# Patient Record
Sex: MALE | Race: OTHER | HISPANIC OR LATINO | URBAN - METROPOLITAN AREA
[De-identification: names, ages, dates, MRNs, and addresses within clinical notes are randomized per-mention and may not be internally consistent; named-entity substitution may affect disease eponyms.]

---

## 2019-08-25 ENCOUNTER — INPATIENT (INPATIENT)
Facility: HOSPITAL | Age: 39
LOS: 0 days | Discharge: HOME | DRG: 918 | End: 2019-08-26
Attending: INTERNAL MEDICINE | Admitting: INTERNAL MEDICINE
Payer: SELF-PAY

## 2019-08-25 VITALS
HEART RATE: 69 BPM | TEMPERATURE: 98 F | SYSTOLIC BLOOD PRESSURE: 124 MMHG | OXYGEN SATURATION: 98 % | RESPIRATION RATE: 15 BRPM | DIASTOLIC BLOOD PRESSURE: 83 MMHG

## 2019-08-25 DIAGNOSIS — R63.8 OTHER SYMPTOMS AND SIGNS CONCERNING FOOD AND FLUID INTAKE: ICD-10-CM

## 2019-08-25 DIAGNOSIS — Z91.89 OTHER SPECIFIED PERSONAL RISK FACTORS, NOT ELSEWHERE CLASSIFIED: ICD-10-CM

## 2019-08-25 DIAGNOSIS — I21.4 NON-ST ELEVATION (NSTEMI) MYOCARDIAL INFARCTION: ICD-10-CM

## 2019-08-25 DIAGNOSIS — F14.10 COCAINE ABUSE, UNCOMPLICATED: ICD-10-CM

## 2019-08-25 LAB
ALBUMIN SERPL ELPH-MCNC: 4.1 G/DL — SIGNIFICANT CHANGE UP (ref 3.4–5)
ALP SERPL-CCNC: 91 U/L — SIGNIFICANT CHANGE UP (ref 40–120)
ALT FLD-CCNC: 22 U/L — SIGNIFICANT CHANGE UP (ref 12–42)
ANION GAP SERPL CALC-SCNC: 8 MMOL/L — LOW (ref 9–16)
APTT BLD: 32 SEC — SIGNIFICANT CHANGE UP (ref 27.5–36.3)
APTT BLD: 55.6 SEC — HIGH (ref 27.5–36.3)
AST SERPL-CCNC: 21 U/L — SIGNIFICANT CHANGE UP (ref 15–37)
BASOPHILS NFR BLD AUTO: 0.4 % — SIGNIFICANT CHANGE UP (ref 0–2)
BILIRUB SERPL-MCNC: 0.8 MG/DL — SIGNIFICANT CHANGE UP (ref 0.2–1.2)
BUN SERPL-MCNC: 16 MG/DL — SIGNIFICANT CHANGE UP (ref 7–23)
CALCIUM SERPL-MCNC: 9.1 MG/DL — SIGNIFICANT CHANGE UP (ref 8.5–10.5)
CHLORIDE SERPL-SCNC: 107 MMOL/L — SIGNIFICANT CHANGE UP (ref 96–108)
CO2 SERPL-SCNC: 29 MMOL/L — SIGNIFICANT CHANGE UP (ref 22–31)
CREAT SERPL-MCNC: 0.88 MG/DL — SIGNIFICANT CHANGE UP (ref 0.5–1.3)
EOSINOPHIL NFR BLD AUTO: 2.8 % — SIGNIFICANT CHANGE UP (ref 0–6)
ETHANOL SERPL-MCNC: <3 MG/DL — SIGNIFICANT CHANGE UP
GLUCOSE SERPL-MCNC: 96 MG/DL — SIGNIFICANT CHANGE UP (ref 70–99)
HCT VFR BLD CALC: 48 % — SIGNIFICANT CHANGE UP (ref 39–50)
HGB BLD-MCNC: 16.1 G/DL — SIGNIFICANT CHANGE UP (ref 13–17)
IMM GRANULOCYTES NFR BLD AUTO: 0.4 % — SIGNIFICANT CHANGE UP (ref 0–1.5)
INR BLD: 1.16 — SIGNIFICANT CHANGE UP (ref 0.88–1.16)
LYMPHOCYTES # BLD AUTO: 25.1 % — SIGNIFICANT CHANGE UP (ref 13–44)
MAGNESIUM SERPL-MCNC: 1.9 MG/DL — SIGNIFICANT CHANGE UP (ref 1.6–2.6)
MCHC RBC-ENTMCNC: 28.3 PG — SIGNIFICANT CHANGE UP (ref 27–34)
MCHC RBC-ENTMCNC: 33.5 G/DL — SIGNIFICANT CHANGE UP (ref 32–36)
MCV RBC AUTO: 84.5 FL — SIGNIFICANT CHANGE UP (ref 80–100)
MONOCYTES NFR BLD AUTO: 9.8 % — SIGNIFICANT CHANGE UP (ref 2–14)
NEUTROPHILS NFR BLD AUTO: 61.5 % — SIGNIFICANT CHANGE UP (ref 43–77)
NT-PROBNP SERPL-SCNC: 465 PG/ML — HIGH
PCP SPEC-MCNC: SIGNIFICANT CHANGE UP
PLATELET # BLD AUTO: 164 K/UL — SIGNIFICANT CHANGE UP (ref 150–400)
POTASSIUM SERPL-MCNC: 4.1 MMOL/L — SIGNIFICANT CHANGE UP (ref 3.5–5.3)
POTASSIUM SERPL-SCNC: 4.1 MMOL/L — SIGNIFICANT CHANGE UP (ref 3.5–5.3)
PROT SERPL-MCNC: 7.7 G/DL — SIGNIFICANT CHANGE UP (ref 6.4–8.2)
PROTHROM AB SERPL-ACNC: 12.8 SEC — SIGNIFICANT CHANGE UP (ref 10–12.9)
RBC # BLD: 5.68 M/UL — SIGNIFICANT CHANGE UP (ref 4.2–5.8)
RBC # FLD: 13.5 % — SIGNIFICANT CHANGE UP (ref 10.3–14.5)
SODIUM SERPL-SCNC: 144 MMOL/L — SIGNIFICANT CHANGE UP (ref 132–145)
TROPONIN I SERPL-MCNC: 0.7 NG/ML — CRITICAL HIGH (ref 0.02–0.06)
TROPONIN T SERPL-MCNC: <0.01 NG/ML — SIGNIFICANT CHANGE UP (ref 0–0.01)
TROPONIN T SERPL-MCNC: <0.01 NG/ML — SIGNIFICANT CHANGE UP (ref 0–0.01)
TSH SERPL-MCNC: 0.24 UIU/ML — LOW (ref 0.36–3.74)
WBC # BLD: 6.7 K/UL — SIGNIFICANT CHANGE UP (ref 3.8–10.5)
WBC # FLD AUTO: 6.7 K/UL — SIGNIFICANT CHANGE UP (ref 3.8–10.5)

## 2019-08-25 PROCEDURE — 71046 X-RAY EXAM CHEST 2 VIEWS: CPT | Mod: 26

## 2019-08-25 PROCEDURE — 99285 EMERGENCY DEPT VISIT HI MDM: CPT | Mod: 25

## 2019-08-25 PROCEDURE — 99222 1ST HOSP IP/OBS MODERATE 55: CPT

## 2019-08-25 PROCEDURE — 93010 ELECTROCARDIOGRAM REPORT: CPT

## 2019-08-25 RX ORDER — NIFEDIPINE 30 MG
30 TABLET, EXTENDED RELEASE 24 HR ORAL DAILY
Refills: 0 | Status: DISCONTINUED | OUTPATIENT
Start: 2019-08-26 | End: 2019-08-26

## 2019-08-25 RX ORDER — ATORVASTATIN CALCIUM 80 MG/1
80 TABLET, FILM COATED ORAL AT BEDTIME
Refills: 0 | Status: DISCONTINUED | OUTPATIENT
Start: 2019-08-25 | End: 2019-08-26

## 2019-08-25 RX ORDER — HEPARIN SODIUM 5000 [USP'U]/ML
INJECTION INTRAVENOUS; SUBCUTANEOUS
Qty: 25000 | Refills: 0 | Status: DISCONTINUED | OUTPATIENT
Start: 2019-08-25 | End: 2019-08-26

## 2019-08-25 RX ORDER — NIFEDIPINE 30 MG
30 TABLET, EXTENDED RELEASE 24 HR ORAL ONCE
Refills: 0 | Status: COMPLETED | OUTPATIENT
Start: 2019-08-25 | End: 2019-08-25

## 2019-08-25 RX ORDER — ASPIRIN/CALCIUM CARB/MAGNESIUM 324 MG
81 TABLET ORAL DAILY
Refills: 0 | Status: DISCONTINUED | OUTPATIENT
Start: 2019-08-25 | End: 2019-08-26

## 2019-08-25 RX ORDER — DIAZEPAM 5 MG
5 TABLET ORAL ONCE
Refills: 0 | Status: DISCONTINUED | OUTPATIENT
Start: 2019-08-25 | End: 2019-08-25

## 2019-08-25 RX ORDER — ASPIRIN/CALCIUM CARB/MAGNESIUM 324 MG
325 TABLET ORAL ONCE
Refills: 0 | Status: COMPLETED | OUTPATIENT
Start: 2019-08-25 | End: 2019-08-25

## 2019-08-25 RX ORDER — NITROGLYCERIN 6.5 MG
0.4 CAPSULE, EXTENDED RELEASE ORAL ONCE
Refills: 0 | Status: COMPLETED | OUTPATIENT
Start: 2019-08-25 | End: 2019-08-25

## 2019-08-25 RX ORDER — HEPARIN SODIUM 5000 [USP'U]/ML
4100 INJECTION INTRAVENOUS; SUBCUTANEOUS ONCE
Refills: 0 | Status: COMPLETED | OUTPATIENT
Start: 2019-08-25 | End: 2019-08-25

## 2019-08-25 RX ORDER — SODIUM CHLORIDE 9 MG/ML
1000 INJECTION INTRAMUSCULAR; INTRAVENOUS; SUBCUTANEOUS ONCE
Refills: 0 | Status: COMPLETED | OUTPATIENT
Start: 2019-08-25 | End: 2019-08-25

## 2019-08-25 RX ADMIN — Medication 30 MILLIGRAM(S): at 15:20

## 2019-08-25 RX ADMIN — SODIUM CHLORIDE 1000 MILLILITER(S): 9 INJECTION INTRAMUSCULAR; INTRAVENOUS; SUBCUTANEOUS at 14:16

## 2019-08-25 RX ADMIN — Medication 5 MILLIGRAM(S): at 14:15

## 2019-08-25 RX ADMIN — Medication 325 MILLIGRAM(S): at 14:15

## 2019-08-25 RX ADMIN — HEPARIN SODIUM 4100 UNIT(S): 5000 INJECTION INTRAVENOUS; SUBCUTANEOUS at 15:14

## 2019-08-25 RX ADMIN — HEPARIN SODIUM 800 UNIT(S)/HR: 5000 INJECTION INTRAVENOUS; SUBCUTANEOUS at 15:15

## 2019-08-25 RX ADMIN — Medication 0.4 MILLIGRAM(S): at 15:02

## 2019-08-25 RX ADMIN — ATORVASTATIN CALCIUM 80 MILLIGRAM(S): 80 TABLET, FILM COATED ORAL at 21:29

## 2019-08-25 NOTE — H&P ADULT - PROBLEM SELECTOR PLAN 1
Patient presented with chest pain, typical anginal symptoms in the setting of cocaine use, accompanied by regional T wave inversions and modest elevation in troponin.   - Symptoms improved significantly with aspirin and nitroglycerin. S/p aspirin load. Continue ASA.  - Atorvastatin 80mg po qd  - On heparin drip, adjusting by PTT's  - Consideration for ischemic disease vs Prinzmetal angina in setting of cocaine use, also received nifedipine XL in [facility] ED.   - Repeat EKG on arrival to [facility] without evolution of ischemic changes. T wave inversions less prominent with no new ST segment changes.   - Trend troponin Patient presented with chest pain, typical anginal symptoms in the setting of cocaine use, accompanied by regional T wave inversions and modest elevation in troponin.   - Risk factors include tobacco use, FHx maternal MI @ 40, cocaine use.   - Symptoms improved significantly with aspirin and nitroglycerin. S/p aspirin load. Continue ASA.  - Atorvastatin 80mg po qd  - On heparin drip, adjusting by PTT's  - Consideration for ischemic disease vs Prinzmetal angina in setting of cocaine use, also received nifedipine XL in [facility] ED.   - Repeat EKG on arrival to [facility] without evolution of ischemic changes. T wave inversions less prominent with no new ST segment changes.   - Trend troponin  - F/u echo  - F/u CT coronaries

## 2019-08-25 NOTE — ED PROVIDER NOTE - PHYSICAL EXAMINATION
Vital Signs - nursing notes reviewed and confirmed  Gen - WDWN, NAD, comfortable and non-toxic appearing, speaking in full sentences   Skin - warm, dry, intact  HEENT - AT/NC, PERRL, EOMI, no conjunctival injection, moist oral mucosa, TM intact b/l with good cone of lights, o/p clear with no erythema, edema, or exudate, uvula midline, airway patent, neck supple and NT, FROM, no JVD or carotid bruits b/l, no palpable nodes  CV - S1S2, R/R/R, no reproducible tenderness   Resp - respiration non-labored, CTAB, symmetric bs b/l, no r/r/w  GI - NABS, soft, ND, NT, no rebound or guarding, no CVAT b/l   MS - w/w/p, no c/c/e, calves supple and NT, distal pulses symmetric b/l, brisk cap refills, +SILT  Neuro - AxOx3, no focal neuro deficits, CN II-XII grossly intact, cerebellar function intact, negative pronator drift, negative nystagmus, ambulatory without gait disturbance

## 2019-08-25 NOTE — ED PROVIDER NOTE - CLINICAL SUMMARY MEDICAL DECISION MAKING FREE TEXT BOX
pt p/w L sided CP with palpitations around noon today, s/p cocaine use last night, AFVSS, CXR wnl, EKG with diffuse TWI, trop positive at 0.697, s/p ASA and valium, repeat EKG with persistent TWI, will admit to cardiology for further w/u and monitoring pt p/w L sided CP with palpitations around noon today, s/p cocaine use last night, AFVSS, CXR wnl, EKG with diffuse TWI, trop positive at 0.697, s/p ASA and valium, repeat EKG with persistent TWI, will admit to cardiology for further w/u and monitoring, case discussed with Dr. Landa, will give some ccb for potential vasospasm as well

## 2019-08-25 NOTE — ED ADULT NURSE NOTE - OBJECTIVE STATEMENT
Patient to ED c/o L sided CP and palpitations x 2 hours.  Pt reports having some alcohol last night and someone gave him a batch of cocaine in the bathroom, pt snorted "a medium amount around 4am," went home and started developing L sided CP around 12pm.  Noted associated palpitations and pain with deep inspiration.

## 2019-08-25 NOTE — H&P ADULT - NSHPREVIEWOFSYSTEMS_GEN_ALL_CORE
REVIEW OF SYSTEMS:  CONSTITUTIONAL: No weakness, fevers or chills.   EYES/ENT: No visual changes;  No vertigo or throat pain   NECK: No pain or stiffness  RESPIRATORY: Chest pain. No cough, wheezing, hemoptysis; No shortness of breath  CARDIOVASCULAR: No chest pain or palpitations  GASTROINTESTINAL: No abdominal or epigastric pain. No nausea, vomiting, or hematemesis; No diarrhea or constipation. No melena or hematochezia.  GENITOURINARY: No dysuria, frequency or hematuria  NEUROLOGICAL: No numbness or weakness  SKIN: No itching, burning, rashes, or lesions   All other review of systems is negative unless indicated above.

## 2019-08-25 NOTE — H&P ADULT - HISTORY OF PRESENT ILLNESS
Patient is a 37 y/o male with history of drug/tobacco/alcohol use presenting for evaluation of chest pain. Patient presented to [facility] this morning after waking up with a 7/10 squeezing substernal pressure with radiation to the arm and back. It was nonexertional, nonpleuritic, and associated with palpitations. He denies nausea, vomiting, diaphoresis, or any other complaints. Patient states that the prior night, he had gone to the club and consumed alcohol and snorted cocaine. He states that over the past month he has had intermittent chest discomfort and tightness which has been exertional and is relieved "by alcohol and cigarettes." In ED he was found to have T-wave inversions in V3-V6 and II, III, and avF. He was loaded with aspirin, started on heparin drip, and received sublingual nitroglycerin which he states improved his pain significantly. At present he states his pain is 3/10, nonradiating, and not associated with any other symptoms.

## 2019-08-25 NOTE — ED ADULT TRIAGE NOTE - CHIEF COMPLAINT QUOTE
patient walk in c/o chest pain since last night after snorting cocaine at club last night; still feels palpitations; HR 69 in triage

## 2019-08-25 NOTE — ED PROVIDER NOTE - DIAGNOSTIC INTERPRETATION
Xray (wet reads) interpreted by DAIJA BYRD   CXR - Cardiac silhouette, aortic knob, mediastinal and hilar contours appear wnl, no acute consolidation, infiltrate, effusion, or PTX. No bony abnormalities noted

## 2019-08-25 NOTE — ED PROVIDER NOTE - OBJECTIVE STATEMENT
37 yo M with no known PMHx, presenting c/o L sided CP and palpitations x 2 hours.  Pt reports having some alcohol last night and someone gave him a batch of cocaine in the bathroom, pt snorted "a medium amount around 4am," went home and started developing L sided CP around 12pm.  Noted associated palpitations and pain with deep inspiration.  Denies fever, chills, diaphoresis, SANTOS, SOB, orthopnea, cough, hemoptysis, wheezing, peripheral edema, focal weakness, numbness, tingling, paresthesia, HA, dizziness, neck pain, N/V/D/C, abdominal pain, change in urinary/bowel function, trauma, fall, rash, and malaise. No family h/o MI/sudden death/PE/CVA/CA 37 yo M with no known PMHx, presenting c/o L sided CP and palpitations x 2 hours.  Pt reports having some alcohol last night and someone gave him a batch of cocaine in the bathroom, pt snorted "a medium amount around 4am," went home and started developing L sided CP around 12pm.  Noted associated palpitations and pain with deep inspiration.  Currently rated 7/10 pain with no radiation.  Denies fever, chills, diaphoresis, SANTOS, SOB, orthopnea, cough, hemoptysis, wheezing, peripheral edema, focal weakness, numbness, tingling, paresthesia, HA, dizziness, neck pain, N/V/D/C, abdominal pain, change in urinary/bowel function, trauma, fall, rash, and malaise. No family h/o MI/sudden death/PE/CVA/CA.  Pt is not cocaine naive. No prior stress test or TTE noted

## 2019-08-25 NOTE — H&P ADULT - PROBLEM SELECTOR PLAN 4
1.       PCP Contacted on Admission: (Y/N) --> Name & Phone #: Has no PCP, will need referral  2.       Date of Contact with PCP:  3.       PCP Contacted at Discharge: (Y/N)  4.       Summary of Handoff Given to PCP:  5.       Post-Discharge Appointment Date and Location: [facility]

## 2019-08-25 NOTE — ED PROVIDER NOTE - CARE PLAN
Principal Discharge DX:	NSTEMI (non-ST elevated myocardial infarction)  Secondary Diagnosis:	Prinzmetal angina

## 2019-08-25 NOTE — PATIENT PROFILE ADULT - NSPROEDALEARNPREF_GEN_A_NUR
audio/pictorial/individual instruction/verbal instruction/skill demonstration/written material/video

## 2019-08-25 NOTE — ED PROVIDER NOTE - ATTENDING CONTRIBUTION TO CARE
37 yo M with no known PMHx, presenting c/o L sided CP and palpitations x 2 hours.  Pt reports having some alcohol last night and someone gave him a batch of cocaine in the bathroom, pt snorted "a medium amount around 4am," went home and started developing L sided CP around 12pm.    vss, afebrile    exam: awake/alert    lungs: cta bilat  cor: rrr s mcr  abd: snt, + bs    data: ekg x 2 with inverted T waved V3-V6, 2,3, avf,  troponin positive with normal renal fxn.    imp: cocaine induced vasospasm/Prinzmetal's angina  plan: consult cardiology [facility]

## 2019-08-25 NOTE — PATIENT PROFILE ADULT - STATED REASON FOR ADMISSION
Pt stated that he went to a club last night, had a few drinks and cocaine,      Pt stated  that he felt chest pain radiating to left side of chest.

## 2019-08-25 NOTE — H&P ADULT - NSHPSOCIALHISTORY_GEN_ALL_CORE
Tobacco: 2-3 cigarettes daily  Alcohol: Binge drinking, unable to quantify  Drugs: Cocaine occasionally, snorts, has done for years. No IVDU  Lives: With family

## 2019-08-25 NOTE — H&P ADULT - NSHPPHYSICALEXAM_GEN_ALL_CORE
VITAL SIGNS:  T(C): 36.5 (08-25-19 @ 16:56), Max: 36.7 (08-25-19 @ 15:57)  T(F): 97.7 (08-25-19 @ 16:56), Max: 98 (08-25-19 @ 15:57)  HR: 72 (08-25-19 @ 17:00) (66 - 72)  BP: 122/80 (08-25-19 @ 17:00) (122/80 - 127/88)  BP(mean): 93 (08-25-19 @ 17:00) (93 - 93)  RR: 15 (08-25-19 @ 17:00) (15 - 18)  SpO2: 99% (08-25-19 @ 17:00) (98% - 99%)  Wt(kg): --    PHYSICAL EXAM:  Constitutional: WDWN resting comfortably in bed; NAD  Head: NC/AT  Eyes: PERRL, EOMI, anicteric sclera  ENT: no nasal discharge; uvula midline, no oropharyngeal erythema or exudates; MMM  Neck: supple; no JVD or thyromegaly  Respiratory: CTA B/L; no W/R/R, no retractions  Cardiac: +S1/S2; RRR; no M/R/G; PMI non-displaced  Gastrointestinal: soft, NT/ND; no rebound or guarding; +BSx4  Back: spine midline, no bony tenderness or step-offs; no CVAT B/L  Extremities: WWP, no clubbing or cyanosis; no peripheral edema  Musculoskeletal: NROM x4; no joint swelling, tenderness or erythema  Vascular: 2+ radial, femoral, DP/PT pulses B/L  Dermatologic: skin warm, dry and intact; no rashes, wounds, or scars  Neurologic: AAOx3; CNII-XII grossly intact; no focal deficits  Psychiatric: affect and characteristics of appearance, verbalizations, behaviors are appropriate

## 2019-08-25 NOTE — H&P ADULT - PROBLEM SELECTOR PLAN 2
Patient reports years of cocaine use with occasional similar symptoms especially worse in past month  - Discussed cessation with patient

## 2019-08-25 NOTE — H&P ADULT - NSHPLABSRESULTS_GEN_ALL_CORE
LABS:                        16.1   6.7   )-----------( 164      ( 25 Aug 2019 14:19 )             48.0     08-25    144  |  107  |  16  ----------------------------<  96  4.1   |  29  |  0.88    Ca    9.1      25 Aug 2019 14:19  Mg     1.9     08-25    TPro  7.7  /  Alb  4.1  /  TBili  0.8  /  DBili  x   /  AST  21  /  ALT  22  /  AlkPhos  91  08-25    PT/INR - ( 25 Aug 2019 15:01 )   PT: 12.8 sec;   INR: 1.16          PTT - ( 25 Aug 2019 15:01 )  PTT:32.0 sec    CAPILLARY BLOOD GLUCOSE          RADIOLOGY & ADDITIONAL TESTS: Reviewed.

## 2019-08-26 ENCOUNTER — TRANSCRIPTION ENCOUNTER (OUTPATIENT)
Age: 39
End: 2019-08-26

## 2019-08-26 VITALS — TEMPERATURE: 99 F

## 2019-08-26 LAB
ALBUMIN SERPL ELPH-MCNC: 4 G/DL — SIGNIFICANT CHANGE UP (ref 3.3–5)
ALP SERPL-CCNC: 81 U/L — SIGNIFICANT CHANGE UP (ref 40–120)
ALT FLD-CCNC: 13 U/L — SIGNIFICANT CHANGE UP (ref 10–45)
ANION GAP SERPL CALC-SCNC: 12 MMOL/L — SIGNIFICANT CHANGE UP (ref 5–17)
APTT BLD: 53.8 SEC — HIGH (ref 27.5–36.3)
AST SERPL-CCNC: 15 U/L — SIGNIFICANT CHANGE UP (ref 10–40)
BILIRUB SERPL-MCNC: 0.6 MG/DL — SIGNIFICANT CHANGE UP (ref 0.2–1.2)
BUN SERPL-MCNC: 14 MG/DL — SIGNIFICANT CHANGE UP (ref 7–23)
CALCIUM SERPL-MCNC: 8.8 MG/DL — SIGNIFICANT CHANGE UP (ref 8.4–10.5)
CHLORIDE SERPL-SCNC: 107 MMOL/L — SIGNIFICANT CHANGE UP (ref 96–108)
CO2 SERPL-SCNC: 23 MMOL/L — SIGNIFICANT CHANGE UP (ref 22–31)
CREAT SERPL-MCNC: 0.88 MG/DL — SIGNIFICANT CHANGE UP (ref 0.5–1.3)
GLUCOSE SERPL-MCNC: 103 MG/DL — HIGH (ref 70–99)
HCT VFR BLD CALC: 46.8 % — SIGNIFICANT CHANGE UP (ref 39–50)
HGB BLD-MCNC: 15.3 G/DL — SIGNIFICANT CHANGE UP (ref 13–17)
MAGNESIUM SERPL-MCNC: 1.9 MG/DL — SIGNIFICANT CHANGE UP (ref 1.6–2.6)
MCHC RBC-ENTMCNC: 28.9 PG — SIGNIFICANT CHANGE UP (ref 27–34)
MCHC RBC-ENTMCNC: 32.7 GM/DL — SIGNIFICANT CHANGE UP (ref 32–36)
MCV RBC AUTO: 88.3 FL — SIGNIFICANT CHANGE UP (ref 80–100)
NRBC # BLD: 0 /100 WBCS — SIGNIFICANT CHANGE UP (ref 0–0)
PLATELET # BLD AUTO: 133 K/UL — LOW (ref 150–400)
POTASSIUM SERPL-MCNC: 4.5 MMOL/L — SIGNIFICANT CHANGE UP (ref 3.5–5.3)
POTASSIUM SERPL-SCNC: 4.5 MMOL/L — SIGNIFICANT CHANGE UP (ref 3.5–5.3)
PROT SERPL-MCNC: 6.6 G/DL — SIGNIFICANT CHANGE UP (ref 6–8.3)
RBC # BLD: 5.3 M/UL — SIGNIFICANT CHANGE UP (ref 4.2–5.8)
RBC # FLD: 13.6 % — SIGNIFICANT CHANGE UP (ref 10.3–14.5)
SODIUM SERPL-SCNC: 142 MMOL/L — SIGNIFICANT CHANGE UP (ref 135–145)
WBC # BLD: 6.09 K/UL — SIGNIFICANT CHANGE UP (ref 3.8–10.5)
WBC # FLD AUTO: 6.09 K/UL — SIGNIFICANT CHANGE UP (ref 3.8–10.5)

## 2019-08-26 PROCEDURE — 71045 X-RAY EXAM CHEST 1 VIEW: CPT | Mod: 26

## 2019-08-26 PROCEDURE — 80053 COMPREHEN METABOLIC PANEL: CPT

## 2019-08-26 PROCEDURE — 93306 TTE W/DOPPLER COMPLETE: CPT | Mod: 26

## 2019-08-26 PROCEDURE — 83735 ASSAY OF MAGNESIUM: CPT

## 2019-08-26 PROCEDURE — 84484 ASSAY OF TROPONIN QUANT: CPT

## 2019-08-26 PROCEDURE — 85027 COMPLETE CBC AUTOMATED: CPT

## 2019-08-26 PROCEDURE — 83880 ASSAY OF NATRIURETIC PEPTIDE: CPT

## 2019-08-26 PROCEDURE — 93005 ELECTROCARDIOGRAM TRACING: CPT

## 2019-08-26 PROCEDURE — 93306 TTE W/DOPPLER COMPLETE: CPT

## 2019-08-26 PROCEDURE — 99238 HOSP IP/OBS DSCHRG MGMT 30/<: CPT

## 2019-08-26 PROCEDURE — 85730 THROMBOPLASTIN TIME PARTIAL: CPT

## 2019-08-26 PROCEDURE — 75574 CT ANGIO HRT W/3D IMAGE: CPT | Mod: 26

## 2019-08-26 PROCEDURE — 75574 CT ANGIO HRT W/3D IMAGE: CPT

## 2019-08-26 PROCEDURE — 84443 ASSAY THYROID STIM HORMONE: CPT

## 2019-08-26 PROCEDURE — 36415 COLL VENOUS BLD VENIPUNCTURE: CPT

## 2019-08-26 PROCEDURE — 96374 THER/PROPH/DIAG INJ IV PUSH: CPT

## 2019-08-26 PROCEDURE — 99285 EMERGENCY DEPT VISIT HI MDM: CPT | Mod: 25

## 2019-08-26 PROCEDURE — 85610 PROTHROMBIN TIME: CPT

## 2019-08-26 PROCEDURE — 80307 DRUG TEST PRSMV CHEM ANLYZR: CPT

## 2019-08-26 PROCEDURE — 80061 LIPID PANEL: CPT

## 2019-08-26 PROCEDURE — 85025 COMPLETE CBC W/AUTO DIFF WBC: CPT

## 2019-08-26 PROCEDURE — 71046 X-RAY EXAM CHEST 2 VIEWS: CPT

## 2019-08-26 PROCEDURE — 71045 X-RAY EXAM CHEST 1 VIEW: CPT

## 2019-08-26 RX ORDER — MAGNESIUM SULFATE 500 MG/ML
1 VIAL (ML) INJECTION ONCE
Refills: 0 | Status: COMPLETED | OUTPATIENT
Start: 2019-08-26 | End: 2019-08-26

## 2019-08-26 RX ORDER — NIFEDIPINE 30 MG
1 TABLET, EXTENDED RELEASE 24 HR ORAL
Qty: 7 | Refills: 0
Start: 2019-08-26 | End: 2019-09-01

## 2019-08-26 RX ORDER — METOPROLOL TARTRATE 50 MG
50 TABLET ORAL ONCE
Refills: 0 | Status: COMPLETED | OUTPATIENT
Start: 2019-08-26 | End: 2019-08-26

## 2019-08-26 RX ORDER — NIFEDIPINE 30 MG
1 TABLET, EXTENDED RELEASE 24 HR ORAL
Qty: 30 | Refills: 0
Start: 2019-08-26 | End: 2019-09-24

## 2019-08-26 RX ADMIN — Medication 100 GRAM(S): at 08:35

## 2019-08-26 RX ADMIN — Medication 81 MILLIGRAM(S): at 12:32

## 2019-08-26 RX ADMIN — Medication 50 MILLIGRAM(S): at 09:09

## 2019-08-26 RX ADMIN — Medication 30 MILLIGRAM(S): at 06:58

## 2019-08-26 NOTE — DISCHARGE NOTE NURSING/CASE MANAGEMENT/SOCIAL WORK - NSDCDPATPORTLINK_GEN_ALL_CORE
You can access the [facility] Patient Portal, offered by [facility], by registering with the following website: [email or web address]

## 2019-08-26 NOTE — DISCHARGE NOTE NURSING/CASE MANAGEMENT/SOCIAL WORK - NSDCPEEMAIL_GEN_ALL_CORE
[facility] for Tobacco Control email [email or web address]

## 2019-08-26 NOTE — DISCHARGE NOTE PROVIDER - HOSPITAL COURSE
37 y/o male with history of drug/tobacco/alcohol use presenting for evaluation of chest pain. Patient presented to [facility] this morning after waking up with a 7/10 squeezing substernal pressure with radiation to the arm and back. It was nonexertional, nonpleuritic, and associated with palpitations. He denies nausea, vomiting, diaphoresis, or any other complaints. Patient states that the prior night, he had gone to the club and consumed alcohol and snorted cocaine. He states that over the past month he has had intermittent chest discomfort and tightness which has been exertional and is relieved "by alcohol and cigarettes." In ED he was found to have T-wave inversions in V3-V6 and II, III, and avF. He was loaded with aspirin, started on heparin drip, and received sublingual nitroglycerin which he states improved his pain significantly. At present he states his pain is 3/10, nonradiating, and not associated with any other symptoms. 37 y/o male with history of drug/tobacco/alcohol use presenting for evaluation of chest pain. Patient presented to [facility] after waking up with a 7/10 squeezing substernal pressure with radiation to the arm and back,  associated with palpitations. Patient states that the prior night, he had gone to the club and consumed alcohol and snorted cocaine. He states that over the past month he has had intermittent chest discomfort and tightness which has been exertional and is relieved "by alcohol and cigarettes." In ED he was found to have NSR w/ T-wave inversions in V3-V6 and II, III, and avF. He was loaded with aspirin, started on heparin drip, and received sublingual nitroglycerin which he states improved his pain significantly. Trop I 0.697 at [facility], Trop T negative x 2 at [facility]. Utox position for cocaine and THC. ECHO performed w/ EF 65%, trace MR/TR. CTA Cardiac w/ normal coronaries, zero calcium score, 2 shallow bridging mid/distal LAD, 4 prominent crypts to LV myocardium. To consider Cardiac MRI as outpatient. He was started on Nifedipine XL 30mg qd for vasodilatory effects in light of CP 2/2 cocaine use. On the day of discharge, the patient was seen and examined. Symptoms improved. Vital signs are stable. Labs and imaging reviewed. Patient is medically optimized and hemodynamically stable. Return precautions discussed, medication teach back done, and importance of physician followup emphasized. The patient verbalized understanding.

## 2019-08-26 NOTE — DISCHARGE NOTE NURSING/CASE MANAGEMENT/SOCIAL WORK - NSDCPEWEB_GEN_ALL_CORE
[place] website --- [email or web address]/[email or web address] for Tobacco Control website --- [email or web address]

## 2019-08-26 NOTE — SBIRT NOTE ADULT - NSSBIRTDRGPOSREINDET_GEN_A_CORE
Patient stated that he uses cocaine about twice a month at the club if friends offer it to him. Patient stated that he could stop if he wanted to and declined any resources.

## 2019-08-26 NOTE — DISCHARGE NOTE PROVIDER - NSDCCPCAREPLAN_GEN_ALL_CORE_FT
PRINCIPAL DISCHARGE DIAGNOSIS  Diagnosis: Chest pain  Assessment and Plan of Treatment:       SECONDARY DISCHARGE DIAGNOSES  Diagnosis: Prinzmetal angina  Assessment and Plan of Treatment: PRINCIPAL DISCHARGE DIAGNOSIS  Diagnosis: Chest pain  Assessment and Plan of Treatment: You came into the hospital for chest pain and had unremarkable echocardiogram. CT Scan of Heart showed normal coronary arteries. It also had incidental finding of crypts within the heart muscle, for which you will likely get a cardiac MRI as an outpatient for further workup. Please stop using cocaine as it came mimic symptoms of a heart attack.

## 2019-08-26 NOTE — SBIRT NOTE ADULT - NSSBIRTBRIEFINTDET_GEN_A_CORE
met with patient at bedside due to ETOH referral.  role explained; patient verbalized understanding. Patient stated that he drinks about 8 beers, twice a week when he goes out to the club. Patient stated that it does not interfere with his everyday living. Patient stated that when he lived in [place] he used to drink much more, but cut back since coming to the [place]. Patient has never been to rehab and is not linked to any outpatient programs.  offered patient resources but patient declined.

## 2019-08-26 NOTE — DISCHARGE NOTE PROVIDER - CARE PROVIDER_API CALL
Lesly Landa)  Cardiovascular Disease; Internal Medicine  [address] [address]  Phone: [phone]  Fax: [phone]  Follow Up Time: 2 weeks

## 2019-08-29 DIAGNOSIS — F14.19 COCAINE ABUSE WITH UNSPECIFIED COCAINE-INDUCED DISORDER: ICD-10-CM

## 2019-08-29 DIAGNOSIS — R07.9 CHEST PAIN, UNSPECIFIED: ICD-10-CM

## 2019-08-29 DIAGNOSIS — T40.5X4A POISONING BY COCAINE, UNDETERMINED, INITIAL ENCOUNTER: ICD-10-CM

## 2019-08-29 DIAGNOSIS — I20.1 ANGINA PECTORIS WITH DOCUMENTED SPASM: ICD-10-CM

## 2019-08-29 DIAGNOSIS — F17.200 NICOTINE DEPENDENCE, UNSPECIFIED, UNCOMPLICATED: ICD-10-CM

## 2020-04-05 ENCOUNTER — INPATIENT (INPATIENT)
Facility: HOSPITAL | Age: 40
LOS: 0 days | Discharge: HOME | DRG: 313 | End: 2020-04-06
Attending: INTERNAL MEDICINE | Admitting: INTERNAL MEDICINE
Payer: MEDICAID

## 2020-04-05 VITALS
TEMPERATURE: 98 F | WEIGHT: 169.98 LBS | HEIGHT: 68 IN | OXYGEN SATURATION: 98 % | DIASTOLIC BLOOD PRESSURE: 67 MMHG | HEART RATE: 57 BPM | RESPIRATION RATE: 17 BRPM | SYSTOLIC BLOOD PRESSURE: 118 MMHG

## 2020-04-05 DIAGNOSIS — F19.10 OTHER PSYCHOACTIVE SUBSTANCE ABUSE, UNCOMPLICATED: ICD-10-CM

## 2020-04-05 DIAGNOSIS — R07.9 CHEST PAIN, UNSPECIFIED: ICD-10-CM

## 2020-04-05 PROBLEM — F14.10 COCAINE ABUSE, UNCOMPLICATED: Chronic | Status: ACTIVE | Noted: 2019-08-25

## 2020-04-05 LAB
ALBUMIN SERPL ELPH-MCNC: 3.7 G/DL — SIGNIFICANT CHANGE UP (ref 3.4–5)
ALP SERPL-CCNC: 115 U/L — SIGNIFICANT CHANGE UP (ref 40–120)
ALT FLD-CCNC: 31 U/L — SIGNIFICANT CHANGE UP (ref 12–42)
AMPHET UR-MCNC: NEGATIVE — SIGNIFICANT CHANGE UP
ANION GAP SERPL CALC-SCNC: 10 MMOL/L — SIGNIFICANT CHANGE UP (ref 9–16)
ANION GAP SERPL CALC-SCNC: 13 MMOL/L — SIGNIFICANT CHANGE UP (ref 5–17)
APTT BLD: 31.2 SEC — SIGNIFICANT CHANGE UP (ref 27.5–36.3)
APTT BLD: 39 SEC — HIGH (ref 27.5–36.3)
APTT BLD: 58.1 SEC — HIGH (ref 27.5–36.3)
APTT BLD: >200 SEC — CRITICAL HIGH (ref 27.5–36.3)
AST SERPL-CCNC: 17 U/L — SIGNIFICANT CHANGE UP (ref 15–37)
BARBITURATES UR SCN-MCNC: NEGATIVE — SIGNIFICANT CHANGE UP
BASOPHILS # BLD AUTO: 0.02 K/UL — SIGNIFICANT CHANGE UP (ref 0–0.2)
BASOPHILS NFR BLD AUTO: 0.4 % — SIGNIFICANT CHANGE UP (ref 0–2)
BENZODIAZ UR-MCNC: NEGATIVE — SIGNIFICANT CHANGE UP
BILIRUB SERPL-MCNC: 0.4 MG/DL — SIGNIFICANT CHANGE UP (ref 0.2–1.2)
BUN SERPL-MCNC: 15 MG/DL — SIGNIFICANT CHANGE UP (ref 7–23)
BUN SERPL-MCNC: 17 MG/DL — SIGNIFICANT CHANGE UP (ref 7–23)
CALCIUM SERPL-MCNC: 9 MG/DL — SIGNIFICANT CHANGE UP (ref 8.5–10.5)
CALCIUM SERPL-MCNC: 9.2 MG/DL — SIGNIFICANT CHANGE UP (ref 8.4–10.5)
CHLORIDE SERPL-SCNC: 104 MMOL/L — SIGNIFICANT CHANGE UP (ref 96–108)
CHLORIDE SERPL-SCNC: 106 MMOL/L — SIGNIFICANT CHANGE UP (ref 96–108)
CK MB BLD-MCNC: <0.54 % — SIGNIFICANT CHANGE UP
CK MB CFR SERPL CALC: <0.5 NG/ML — LOW (ref 0.5–3.6)
CK SERPL-CCNC: 93 U/L — SIGNIFICANT CHANGE UP (ref 39–308)
CO2 SERPL-SCNC: 22 MMOL/L — SIGNIFICANT CHANGE UP (ref 22–31)
CO2 SERPL-SCNC: 25 MMOL/L — SIGNIFICANT CHANGE UP (ref 22–31)
COCAINE METAB.OTHER UR-MCNC: POSITIVE
CREAT SERPL-MCNC: 0.94 MG/DL — SIGNIFICANT CHANGE UP (ref 0.5–1.3)
CREAT SERPL-MCNC: 1.15 MG/DL — SIGNIFICANT CHANGE UP (ref 0.5–1.3)
CRP SERPL-MCNC: <0.03 MG/DL — SIGNIFICANT CHANGE UP (ref 0–0.4)
D DIMER BLD IA.RAPID-MCNC: <150 NG/ML DDU — SIGNIFICANT CHANGE UP
EOSINOPHIL # BLD AUTO: 0.21 K/UL — SIGNIFICANT CHANGE UP (ref 0–0.5)
EOSINOPHIL NFR BLD AUTO: 3.8 % — SIGNIFICANT CHANGE UP (ref 0–6)
ERYTHROCYTE [SEDIMENTATION RATE] IN BLOOD: 3 MM/HR — SIGNIFICANT CHANGE UP
FERRITIN SERPL-MCNC: 158 NG/ML — SIGNIFICANT CHANGE UP (ref 30–400)
GLUCOSE SERPL-MCNC: 108 MG/DL — HIGH (ref 70–99)
GLUCOSE SERPL-MCNC: 136 MG/DL — HIGH (ref 70–99)
HCT VFR BLD CALC: 44.8 % — SIGNIFICANT CHANGE UP (ref 39–50)
HCT VFR BLD CALC: 46.3 % — SIGNIFICANT CHANGE UP (ref 39–50)
HCT VFR BLD CALC: 46.5 % — SIGNIFICANT CHANGE UP (ref 39–50)
HCT VFR BLD CALC: 47.3 % — SIGNIFICANT CHANGE UP (ref 39–50)
HGB BLD-MCNC: 15.1 G/DL — SIGNIFICANT CHANGE UP (ref 13–17)
HGB BLD-MCNC: 15.3 G/DL — SIGNIFICANT CHANGE UP (ref 13–17)
HGB BLD-MCNC: 15.6 G/DL — SIGNIFICANT CHANGE UP (ref 13–17)
HGB BLD-MCNC: 15.6 G/DL — SIGNIFICANT CHANGE UP (ref 13–17)
IMM GRANULOCYTES NFR BLD AUTO: 0.4 % — SIGNIFICANT CHANGE UP (ref 0–1.5)
INR BLD: 1.04 — SIGNIFICANT CHANGE UP (ref 0.88–1.16)
LACTATE SERPL-SCNC: 1.2 MMOL/L — SIGNIFICANT CHANGE UP (ref 0.5–2)
LYMPHOCYTES # BLD AUTO: 1.9 K/UL — SIGNIFICANT CHANGE UP (ref 1–3.3)
LYMPHOCYTES # BLD AUTO: 34.1 % — SIGNIFICANT CHANGE UP (ref 13–44)
MCHC RBC-ENTMCNC: 28.5 PG — SIGNIFICANT CHANGE UP (ref 27–34)
MCHC RBC-ENTMCNC: 28.7 PG — SIGNIFICANT CHANGE UP (ref 27–34)
MCHC RBC-ENTMCNC: 28.8 PG — SIGNIFICANT CHANGE UP (ref 27–34)
MCHC RBC-ENTMCNC: 29.1 PG — SIGNIFICANT CHANGE UP (ref 27–34)
MCHC RBC-ENTMCNC: 32.6 GM/DL — SIGNIFICANT CHANGE UP (ref 32–36)
MCHC RBC-ENTMCNC: 33 GM/DL — SIGNIFICANT CHANGE UP (ref 32–36)
MCHC RBC-ENTMCNC: 33.5 GM/DL — SIGNIFICANT CHANGE UP (ref 32–36)
MCHC RBC-ENTMCNC: 34.2 GM/DL — SIGNIFICANT CHANGE UP (ref 32–36)
MCV RBC AUTO: 85.2 FL — SIGNIFICANT CHANGE UP (ref 80–100)
MCV RBC AUTO: 86 FL — SIGNIFICANT CHANGE UP (ref 80–100)
MCV RBC AUTO: 86.5 FL — SIGNIFICANT CHANGE UP (ref 80–100)
MCV RBC AUTO: 88 FL — SIGNIFICANT CHANGE UP (ref 80–100)
METHADONE UR-MCNC: NEGATIVE — SIGNIFICANT CHANGE UP
MONOCYTES # BLD AUTO: 0.56 K/UL — SIGNIFICANT CHANGE UP (ref 0–0.9)
MONOCYTES NFR BLD AUTO: 10 % — SIGNIFICANT CHANGE UP (ref 2–14)
NEUTROPHILS # BLD AUTO: 2.87 K/UL — SIGNIFICANT CHANGE UP (ref 1.8–7.4)
NEUTROPHILS NFR BLD AUTO: 51.3 % — SIGNIFICANT CHANGE UP (ref 43–77)
NRBC # BLD: 0 /100 WBCS — SIGNIFICANT CHANGE UP (ref 0–0)
NT-PROBNP SERPL-SCNC: 82 PG/ML — SIGNIFICANT CHANGE UP (ref 0–300)
OPIATES UR-MCNC: NEGATIVE — SIGNIFICANT CHANGE UP
PCP SPEC-MCNC: SIGNIFICANT CHANGE UP
PCP UR-MCNC: NEGATIVE — SIGNIFICANT CHANGE UP
PLATELET # BLD AUTO: 128 K/UL — LOW (ref 150–400)
PLATELET # BLD AUTO: 132 K/UL — LOW (ref 150–400)
PLATELET # BLD AUTO: 135 K/UL — LOW (ref 150–400)
PLATELET # BLD AUTO: 142 K/UL — LOW (ref 150–400)
POTASSIUM SERPL-MCNC: 3.8 MMOL/L — SIGNIFICANT CHANGE UP (ref 3.5–5.3)
POTASSIUM SERPL-MCNC: 3.8 MMOL/L — SIGNIFICANT CHANGE UP (ref 3.5–5.3)
POTASSIUM SERPL-SCNC: 3.8 MMOL/L — SIGNIFICANT CHANGE UP (ref 3.5–5.3)
POTASSIUM SERPL-SCNC: 3.8 MMOL/L — SIGNIFICANT CHANGE UP (ref 3.5–5.3)
PROT SERPL-MCNC: 6.9 G/DL — SIGNIFICANT CHANGE UP (ref 6.4–8.2)
PROTHROM AB SERPL-ACNC: 11.5 SEC — SIGNIFICANT CHANGE UP (ref 10–12.9)
RBC # BLD: 5.26 M/UL — SIGNIFICANT CHANGE UP (ref 4.2–5.8)
RBC # BLD: 5.26 M/UL — SIGNIFICANT CHANGE UP (ref 4.2–5.8)
RBC # BLD: 5.41 M/UL — SIGNIFICANT CHANGE UP (ref 4.2–5.8)
RBC # BLD: 5.47 M/UL — SIGNIFICANT CHANGE UP (ref 4.2–5.8)
RBC # FLD: 12.2 % — SIGNIFICANT CHANGE UP (ref 10.3–14.5)
RBC # FLD: 12.9 % — SIGNIFICANT CHANGE UP (ref 10.3–14.5)
RBC # FLD: 12.9 % — SIGNIFICANT CHANGE UP (ref 10.3–14.5)
RBC # FLD: 13 % — SIGNIFICANT CHANGE UP (ref 10.3–14.5)
SODIUM SERPL-SCNC: 139 MMOL/L — SIGNIFICANT CHANGE UP (ref 132–145)
SODIUM SERPL-SCNC: 141 MMOL/L — SIGNIFICANT CHANGE UP (ref 135–145)
THC UR QL: POSITIVE
TROPONIN I SERPL-MCNC: 0.54 NG/ML — HIGH (ref 0.02–0.06)
TROPONIN T SERPL-MCNC: <0.01 NG/ML — SIGNIFICANT CHANGE UP (ref 0–0.01)
TROPONIN T SERPL-MCNC: <0.01 NG/ML — SIGNIFICANT CHANGE UP (ref 0–0.01)
TSH SERPL-MCNC: 0.5 UIU/ML — SIGNIFICANT CHANGE UP (ref 0.35–4.94)
WBC # BLD: 5.2 K/UL — SIGNIFICANT CHANGE UP (ref 3.8–10.5)
WBC # BLD: 5.38 K/UL — SIGNIFICANT CHANGE UP (ref 3.8–10.5)
WBC # BLD: 5.48 K/UL — SIGNIFICANT CHANGE UP (ref 3.8–10.5)
WBC # BLD: 5.58 K/UL — SIGNIFICANT CHANGE UP (ref 3.8–10.5)
WBC # FLD AUTO: 5.2 K/UL — SIGNIFICANT CHANGE UP (ref 3.8–10.5)
WBC # FLD AUTO: 5.38 K/UL — SIGNIFICANT CHANGE UP (ref 3.8–10.5)
WBC # FLD AUTO: 5.48 K/UL — SIGNIFICANT CHANGE UP (ref 3.8–10.5)
WBC # FLD AUTO: 5.58 K/UL — SIGNIFICANT CHANGE UP (ref 3.8–10.5)

## 2020-04-05 PROCEDURE — 99223 1ST HOSP IP/OBS HIGH 75: CPT

## 2020-04-05 PROCEDURE — 71045 X-RAY EXAM CHEST 1 VIEW: CPT | Mod: 26

## 2020-04-05 PROCEDURE — 93010 ELECTROCARDIOGRAM REPORT: CPT

## 2020-04-05 PROCEDURE — 99284 EMERGENCY DEPT VISIT MOD MDM: CPT

## 2020-04-05 RX ORDER — HEPARIN SODIUM 5000 [USP'U]/ML
INJECTION INTRAVENOUS; SUBCUTANEOUS
Qty: 25000 | Refills: 0 | Status: DISCONTINUED | OUTPATIENT
Start: 2020-04-05 | End: 2020-04-05

## 2020-04-05 RX ORDER — ATORVASTATIN CALCIUM 80 MG/1
40 TABLET, FILM COATED ORAL AT BEDTIME
Refills: 0 | Status: DISCONTINUED | OUTPATIENT
Start: 2020-04-05 | End: 2020-04-06

## 2020-04-05 RX ORDER — HEPARIN SODIUM 5000 [USP'U]/ML
4700 INJECTION INTRAVENOUS; SUBCUTANEOUS EVERY 6 HOURS
Refills: 0 | Status: DISCONTINUED | OUTPATIENT
Start: 2020-04-05 | End: 2020-04-05

## 2020-04-05 RX ORDER — ASPIRIN/CALCIUM CARB/MAGNESIUM 324 MG
325 TABLET ORAL ONCE
Refills: 0 | Status: COMPLETED | OUTPATIENT
Start: 2020-04-05 | End: 2020-04-05

## 2020-04-05 RX ORDER — ASPIRIN/CALCIUM CARB/MAGNESIUM 324 MG
81 TABLET ORAL DAILY
Refills: 0 | Status: DISCONTINUED | OUTPATIENT
Start: 2020-04-05 | End: 2020-04-06

## 2020-04-05 RX ORDER — NITROGLYCERIN 6.5 MG
0.4 CAPSULE, EXTENDED RELEASE ORAL
Refills: 0 | Status: DISCONTINUED | OUTPATIENT
Start: 2020-04-05 | End: 2020-04-06

## 2020-04-05 RX ORDER — HEPARIN SODIUM 5000 [USP'U]/ML
4700 INJECTION INTRAVENOUS; SUBCUTANEOUS ONCE
Refills: 0 | Status: COMPLETED | OUTPATIENT
Start: 2020-04-05 | End: 2020-04-05

## 2020-04-05 RX ORDER — ENOXAPARIN SODIUM 100 MG/ML
40 INJECTION SUBCUTANEOUS EVERY 24 HOURS
Refills: 0 | Status: DISCONTINUED | OUTPATIENT
Start: 2020-04-05 | End: 2020-04-06

## 2020-04-05 RX ORDER — HEPARIN SODIUM 5000 [USP'U]/ML
750 INJECTION INTRAVENOUS; SUBCUTANEOUS
Qty: 25000 | Refills: 0 | Status: DISCONTINUED | OUTPATIENT
Start: 2020-04-05 | End: 2020-04-05

## 2020-04-05 RX ADMIN — ATORVASTATIN CALCIUM 40 MILLIGRAM(S): 80 TABLET, FILM COATED ORAL at 22:47

## 2020-04-05 RX ADMIN — HEPARIN SODIUM 750 UNIT(S)/HR: 5000 INJECTION INTRAVENOUS; SUBCUTANEOUS at 10:42

## 2020-04-05 RX ADMIN — Medication 325 MILLIGRAM(S): at 04:02

## 2020-04-05 RX ADMIN — Medication 81 MILLIGRAM(S): at 10:42

## 2020-04-05 RX ADMIN — HEPARIN SODIUM 4700 UNIT(S): 5000 INJECTION INTRAVENOUS; SUBCUTANEOUS at 04:02

## 2020-04-05 RX ADMIN — HEPARIN SODIUM 950 UNIT(S)/HR: 5000 INJECTION INTRAVENOUS; SUBCUTANEOUS at 04:02

## 2020-04-05 RX ADMIN — ENOXAPARIN SODIUM 40 MILLIGRAM(S): 100 INJECTION SUBCUTANEOUS at 22:47

## 2020-04-05 NOTE — ED PROVIDER NOTE - OBJECTIVE STATEMENT
39 y.o. male with hx CAD with 1 stent with c/o central substernal chest pain described as pressure like, radiating to the L arm, gradual onset over the last 1-2 hours, 2nd episode from last week. 39 y.o. male with hx CAD with 1 stent with c/o central substernal chest pain described as pressure like, radiating to the L arm, gradual onset over the last 1-2 hours, 2nd episode from last week. CHest pain woke him form sleep, BIBEMS with slightly improved sx, given 162mg aspirin PTA in ED. 39 y.o. male with hx CAD possible MI 3-4 years ago, no cath/stent, was medically managed and told to take nifedipine. Today c/o central substernal chest pain described as pressure like, radiating to the L arm, gradual onset over the last 1-2 hours, 2nd episode from last week. CHest pain woke him form sleep, BIBEMS with slightly improved sx, given 162mg aspirin PTA in ED. 39 y.o. male with hx CAD possible MI 3-4 years ago, no cath/stent, was medically managed and told to take nifedipine. Today c/o central substernal chest pain described as pressure like, radiating to the L arm, gradual onset over the last 1-2 hours, 2nd episode from last week. Chest pain woke him form sleep, BIBEMS with slightly improved sx, given 162mg aspirin PTA in ED. Hx in chart - last visit in august for cocaine induced NSTEMI, was admitted to [facility]/cardiology started on heparin drip, managed medically then dced home. Pt failed to followup and did not stop using cocaine.  Last cocaine use was 2 days ago. FH mom had MI 40s.

## 2020-04-05 NOTE — H&P ADULT - RS GEN PE MLT RESP DETAILS PC
breath sounds equal/respirations non-labored/no rales/airway patent/no rhonchi/no subcutaneous emphysema/good air movement

## 2020-04-05 NOTE — ED PROVIDER NOTE - CLINICAL SUMMARY MEDICAL DECISION MAKING FREE TEXT BOX
chest wall pain, chest wall pain, EKG and cardiac enzymes negative, stable for dc home with outpatient cardiology followup. chest pain, CXR OK, EKG sinus lisy with no new ischemic changes. chest pain free in ED, improved sx.  labs noted with (+) trop, NSTEMI. accepted for admission to cardiology by dr aquino

## 2020-04-05 NOTE — H&P ADULT - NSICDXFAMILYHX_GEN_ALL_CORE_FT
FAMILY HISTORY:  FH: myocardial infarction, Mother he reports had a MI at age 45, no stents placed at that time

## 2020-04-05 NOTE — H&P ADULT - HISTORY OF PRESENT ILLNESS
Patient is a 39 year old male with history of drug/tobacco/alcohol use presented in August 2019 with chest pain and elevated troponin in the setting of NSETMI. He had an ECHO 8/2019 performed w/ EF 65%, trace MR/TR. CTA Cardiac 8/2019 w/ normal coronaries, zero calcium score, 2 shallow bridging mid/distal LAD, 4 prominent crypts to LV myocardium. He was started on Nifedipine and recommended Cardiac MRI as outpatient. He failed to follow up. Today he woke up with chest pain from sleep and presented to [facility]. He reports pain woke him from sleep. Pain is present at rest. Pain is a stabbing in the left breast. Pain does not worsen with inspiration or exertion. At [facility] 12 Lead EKG revealed SB with inverted T waves in V3-V6 and II/III/AVF similar to last admission. His troponin I was 0.536. He reports he has used cocaine in the past 1-2 days but seemed unclear on time frame. His tox screen was + for cocaine and THC. He was given full dose ASA and started on a heparin drip. He denies SOB, orthopnea, PND, Palpitations, dizziness, LE edema, syncope fever, cough, muscle aches, weakness. He was transferred to [facility] for telemetry.

## 2020-04-05 NOTE — PROGRESS NOTE ADULT - PROBLEM SELECTOR PLAN 1
-Currently CP free  -Trop I 0.536 in [facility], f/u trops negative,  heparin gtt discontinued as subsequent trops negative, possibly vasospasm in setting of cocaine abuse  -EKG: sinus lisy with TWI V3-V6, leads II, III, and aVF  -Patient with Echo 08/2019 EF 65%, trace MR/TR, and CCTA 08/2019 with normal coronaries, and Ca score 0  -NPO after MN for CTA coronaries  -continue aspirin 81mg QD and Lipitor 40mg QD

## 2020-04-05 NOTE — ED ADULT TRIAGE NOTE - CHIEF COMPLAINT QUOTE
Biba for chest pressure waking him from sleep 1 hr PTA. Denies cough, SOB, fever or chills. PMH CAD with stent. EKG done PTA by EMS. Given 162mg ASA en route. PT reports improvement in pain. Biba for chest pressure waking him from sleep 1 hr PTA. Denies cough, SOB, fever or chills. EKG done PTA by EMS. Given 162mg ASA en route. PT reports improvement in pain.

## 2020-04-05 NOTE — ED ADULT NURSE NOTE - CHPI ED NUR SYMPTOMS NEG
no dizziness/no fever/no vomiting/no back pain/no congestion/no shortness of breath/no diaphoresis/no chills/no nausea/no syncope

## 2020-04-05 NOTE — ED PROVIDER NOTE - NS ED ROS FT
Denies fever, chills, palpitations, diaphoresis, SANTOS, SOB, PND, exertional sx, orthopnea, cough, hemoptysis, wheezing, peripheral edema, focal weakness, numbness, tingling, paresthesia, HA, dizziness, neck pain, N/V/D/C, abdominal pain, change in urinary/bowel function, trauma, fall, rash, and malaise.

## 2020-04-05 NOTE — H&P ADULT - NSHPLABSRESULTS_GEN_ALL_CORE
Troponin I, Serum (04.05.20 @ 02:37)    Troponin I, Serum: 0.536: TYPE:(C=Critical, N=Notification, A=Abnormal) N  Cocaine Metabolite, Urine (04.05.20 @ 04:09)    Cocaine Metabolite, Urine: Positive

## 2020-04-05 NOTE — ED ADULT NURSE NOTE - CHIEF COMPLAINT QUOTE
Biba for chest pressure waking him from sleep 1 hr PTA. Denies cough, SOB, fever or chills. EKG done PTA by EMS. Given 162mg ASA en route. PT reports improvement in pain.

## 2020-04-05 NOTE — H&P ADULT - ATTENDING COMMENTS
Assessment on date 04-05-20 Patient personally seen and examined myself during rounds, face-to-face, with the NPP  Note read, including vitals, physical findings, laboratory data, and radiological reports.   Agree with above with revisions, if any included below.     - Chief Complaint: Chest Pain      - Laboratory data reviewed by me    -My exam:  Neck: no JVD  Cardiovascular: RRR S1S2 no murmurs  Respiratory: CTA B/L no rales  Extremities: no edema, no cyanosis    - Diagnosis and Plan:  NSTEMI - plan for CCTA and echo, IV heparin started, will continue Add Lipitor 40mg PO QHS Start ASA 81mg PO Daily  Polysubstance abuse - No BB 2/2 cocaine use, Add SL Nitro. He was started on nifedipine last admission for possible vasospasm in the setting of cocaine use but did not continue    -Plan discussed with  [facility] for luke;y invasive startegy, etiology prizmal, no cath recommend ccta.  NPP regarding medical management of Chest Pain

## 2020-04-05 NOTE — ED PROVIDER NOTE - PHYSICAL EXAMINATION
VSS in NAD non toxic appearing   NCAT EOMI PERRL OP clear  heart RRR no murmur   lungs CTA no wheezing no rales no rhonchi   abd soft NT ND no CVAT no guarding no rebound   normal neuro exam CN I-XII grossly intact, no groos motor or sensory deficits  no peripheral c/c/e

## 2020-04-05 NOTE — ED PROVIDER NOTE - CARE PLAN
Principal Discharge DX:	Chest pain Principal Discharge DX:	NSTEMI (non-ST elevated myocardial infarction)  Secondary Diagnosis:	Cocaine abuse

## 2020-04-05 NOTE — ED PROVIDER NOTE - PROGRESS NOTE DETAILS
chest pain free in ED, improved sx.  labs noted with (+) trop, NSTEMI. accepted for admission to cardiology by dr aquino

## 2020-04-05 NOTE — ED ADULT NURSE NOTE - OBJECTIVE STATEMENT
39y male presents to ED c/o chest pressure. Hx of stent, states sx began 30 min PTA. Denies sob, fevers, chills, sick contacts. 39y male presents to ED c/o chest pain. Hx of CAD, was seen at [facility] cardiology for sx a few years ago. States sx began 30 min PTA. Denies sob, fevers, chills, sick contacts.

## 2020-04-05 NOTE — PROGRESS NOTE ADULT - SUBJECTIVE AND OBJECTIVE BOX
Interventional Cardiology PA Adult Progress Note    CC: chest pain on admission   Subjective Assessment: Patient examined at bedside this AM. Patient feeling well this AM. Patient denies CP, SOB, or palpitations  ROS negative except as per subjective   	  MEDICATIONS:  nitroglycerin     SubLingual 0.4 milliGRAM(s) SubLingual every 5 minutes PRN  atorvastatin 40 milliGRAM(s) Oral at bedtime  aspirin enteric coated 81 milliGRAM(s) Oral daily  heparin  Infusion. 750 Unit(s)/Hr IV Continuous <Continuous>  heparin  Injectable 4700 Unit(s) IV Push every 6 hours PRN      	    [PHYSICAL EXAM:  TELEMETRY:  T(C): 35.7 (04-05-20 @ 09:00), Max: 36.7 (04-05-20 @ 04:29)  HR: 53 (04-05-20 @ 06:12) (52 - 57)  BP: 124/78 (04-05-20 @ 06:12) (118/67 - 124/78)  RR: 14 (04-05-20 @ 06:12) (14 - 17)  SpO2: 99% (04-05-20 @ 06:12) (98% - 99%)    I&O's Summary    05 Apr 2020 07:01  -  05 Apr 2020 14:10  --------------------------------------------------------  IN: 22.5 mL / OUT: 0 mL / NET: 22.5 mL      Height (cm): 172.7 (04-05 @ 05:20)  Weight (kg): 77.1 (04-05 @ 05:20)  BMI (kg/m2): 25.9 (04-05 @ 05:20)  BSA (m2): 1.91 (04-05 @ 05:20)    Sung: No  Central/PICC/Mid Line: No                                       Appearance: Normal	  HEENT:   Normal oral mucosa, PERRL, EOMI	  Neck: Supple, - JVD; no Carotid Bruit   Cardiovascular: Normal S1 S2, No JVD, No murmurs,   Respiratory: Lungs clear to auscultation, No Rales, Rhonchi, Wheezing	  Gastrointestinal:  Soft, Non-tender, + BS	  Skin: No rashes, No ecchymoses, No cyanosis  Extremities: Normal range of motion, No clubbing, cyanosis or edema  Vascular: Peripheral pulses palpable 2+ bilaterally  Neurologic: Non-focal  Psychiatry: A & O x 3, Mood & affect appropriate      	    	    LABS:	 	  CARDIAC MARKERS:  Troponin I, Serum: 0.536 ng/mL (04-05 @ 02:37)  Troponin I, Serum: 0.536 ng/mL (04-05 @ 02:37)                          15.6   5.48  )-----------( 128      ( 05 Apr 2020 10:12 )             46.5     04-05    141  |  106  |  15  ----------------------------<  108<H>  3.8   |  22  |  0.94    Ca    9.2      05 Apr 2020 06:44    TPro  6.9  /  Alb  3.7  /  TBili  0.4  /  DBili  x   /  AST  17  /  ALT  31  /  AlkPhos  115  04-05    proBNP: Serum Pro-Brain Natriuretic Peptide: 82 pg/mL (04-05 @ 06:44)    PT/INR - ( 05 Apr 2020 03:41 )   PT: 11.5 sec;   INR: 1.04          PTT - ( 05 Apr 2020 10:12 )  PTT:39.0 sec

## 2020-04-05 NOTE — H&P ADULT - ASSESSMENT
Patient is a 39 year old male with history of drug/tobacco/alcohol use presented in August 2019 with chest pain and elevated troponin in the setting of NSETMI. He had an ECHO 8/2019 performed w/ EF 65%, trace MR/TR. CTA Cardiac 8/2019 w/ normal coronaries, zero calcium score, 2 shallow bridging mid/distal LAD, 4 prominent crypts to LV myocardium. He was started on Nifedipine and recommended Cardiac MRI as outpatient. He failed to follow up. Today he woke up with chest pain from sleep and presented to [facility]. He reports pain woke him from sleep. Pain is present at rest. Pain is a stabbing in the left breast. Pain does not worsen with inspiration or exertion. At [facility] 12 Lead EKG revealed SB with inverted T waves in V3-V6 and II/III/AVF similar to last admission. His troponin I was 0.536. He reports he has used cocaine in the past 1-2 days but seemed unclear on time frame. His tox screen was + for cocaine and THC. He was given full dose ASA and started on a heparin drip. He denies SOB, orthopnea, PND, Palpitations, dizziness, LE edema, syncope fever, cough, muscle aches, weakness. He was transferred to [facility] for telemetry.     Impression  1. Presents with chest pain in the setting of recent cocaine use  2. Elevated troponin 0.536  3. Non compliance  4. Tobacco use  5. Polysubstance abuse  6. 2D echo 8/2019  EF 65%, trace MR/TR  7. CCTA 8/2019 w/ normal coronaries, zero calcium score, 2 shallow bridging mid/distal LAD, 4 prominent crypts to LV myocardium    Recommendations  1. Serial troponin to peak  2. IV heparin started at [facility], will continue  3. Add Lipitor 40mg PO QHS  4. Start ASA 81mg PO Daily  5. Hold off on starting oral antiplatelets given normal C's with calcium score of 0 less than 1 year ago  6. Repeat 2D echo  7. No BB 2/2 cocaine use  8. Add SL Nitro. He was started on nifedipine last admission for possible vasospasm in the setting of cocaine use but did not continue  9. Consider Inpt [facility]RI given poor outpatient compliance

## 2020-04-05 NOTE — H&P ADULT - GASTROINTESTINAL DETAILS
no rebound tenderness/no rigidity/soft/nontender/no masses palpable/bowel sounds normal/no bruit/no distention/no guarding/no organomegaly

## 2020-04-05 NOTE — PROGRESS NOTE ADULT - PROBLEM SELECTOR PLAN 2
-Utox positive for THC and cocaine, patient reports last cocaine use 1-2 days ago  -Patient counseled on cocaine abuse, risks and potential death patient verbalized understanding, and said will attempt to quit. Discussed with patient if he would like help with drug use as outpatient patient refuses at this time, and will attempt on own.  -Will monitor for withdrawal symptoms    DVT PPx: Lovenox  Dispo: NPO after MN for CCTA    Case d/w Dr. Narayan

## 2020-04-05 NOTE — PROGRESS NOTE ADULT - ASSESSMENT
Patient is a 39 year old male with history of drug/tobacco/alcohol use presented in August 2019 with chest pain and elevated troponin in the setting of NSETMI. He was transferred to [facility] for telemetry and further cardiac workup

## 2020-04-06 ENCOUNTER — TRANSCRIPTION ENCOUNTER (OUTPATIENT)
Age: 40
End: 2020-04-06

## 2020-04-06 VITALS
OXYGEN SATURATION: 99 % | RESPIRATION RATE: 18 BRPM | DIASTOLIC BLOOD PRESSURE: 67 MMHG | HEART RATE: 51 BPM | SYSTOLIC BLOOD PRESSURE: 103 MMHG

## 2020-04-06 LAB
ANION GAP SERPL CALC-SCNC: 13 MMOL/L — SIGNIFICANT CHANGE UP (ref 5–17)
BUN SERPL-MCNC: 18 MG/DL — SIGNIFICANT CHANGE UP (ref 7–23)
CALCIUM SERPL-MCNC: 9 MG/DL — SIGNIFICANT CHANGE UP (ref 8.4–10.5)
CHLORIDE SERPL-SCNC: 106 MMOL/L — SIGNIFICANT CHANGE UP (ref 96–108)
CHOLEST SERPL-MCNC: 175 MG/DL — SIGNIFICANT CHANGE UP (ref 10–199)
CO2 SERPL-SCNC: 22 MMOL/L — SIGNIFICANT CHANGE UP (ref 22–31)
CREAT SERPL-MCNC: 1.05 MG/DL — SIGNIFICANT CHANGE UP (ref 0.5–1.3)
GLUCOSE SERPL-MCNC: 100 MG/DL — HIGH (ref 70–99)
HBA1C BLD-MCNC: 5.7 % — HIGH (ref 4–5.6)
HCT VFR BLD CALC: 49.5 % — SIGNIFICANT CHANGE UP (ref 39–50)
HDLC SERPL-MCNC: 45 MG/DL — SIGNIFICANT CHANGE UP
HGB BLD-MCNC: 16.3 G/DL — SIGNIFICANT CHANGE UP (ref 13–17)
LIPID PNL WITH DIRECT LDL SERPL: 94 MG/DL — SIGNIFICANT CHANGE UP
MAGNESIUM SERPL-MCNC: 1.9 MG/DL — SIGNIFICANT CHANGE UP (ref 1.6–2.6)
MCHC RBC-ENTMCNC: 28.6 PG — SIGNIFICANT CHANGE UP (ref 27–34)
MCHC RBC-ENTMCNC: 32.9 GM/DL — SIGNIFICANT CHANGE UP (ref 32–36)
MCV RBC AUTO: 86.8 FL — SIGNIFICANT CHANGE UP (ref 80–100)
NRBC # BLD: 0 /100 WBCS — SIGNIFICANT CHANGE UP (ref 0–0)
PLATELET # BLD AUTO: 142 K/UL — LOW (ref 150–400)
POTASSIUM SERPL-MCNC: 4.1 MMOL/L — SIGNIFICANT CHANGE UP (ref 3.5–5.3)
POTASSIUM SERPL-SCNC: 4.1 MMOL/L — SIGNIFICANT CHANGE UP (ref 3.5–5.3)
RBC # BLD: 5.7 M/UL — SIGNIFICANT CHANGE UP (ref 4.2–5.8)
RBC # FLD: 12.8 % — SIGNIFICANT CHANGE UP (ref 10.3–14.5)
SODIUM SERPL-SCNC: 141 MMOL/L — SIGNIFICANT CHANGE UP (ref 135–145)
TOTAL CHOLESTEROL/HDL RATIO MEASUREMENT: 3.9 RATIO — SIGNIFICANT CHANGE UP (ref 3.4–9.6)
TRIGL SERPL-MCNC: 179 MG/DL — HIGH (ref 10–149)
WBC # BLD: 5.65 K/UL — SIGNIFICANT CHANGE UP (ref 3.8–10.5)
WBC # FLD AUTO: 5.65 K/UL — SIGNIFICANT CHANGE UP (ref 3.8–10.5)

## 2020-04-06 PROCEDURE — 86140 C-REACTIVE PROTEIN: CPT

## 2020-04-06 PROCEDURE — 80061 LIPID PANEL: CPT

## 2020-04-06 PROCEDURE — 85025 COMPLETE CBC W/AUTO DIFF WBC: CPT

## 2020-04-06 PROCEDURE — 99233 SBSQ HOSP IP/OBS HIGH 50: CPT

## 2020-04-06 PROCEDURE — 85027 COMPLETE CBC AUTOMATED: CPT

## 2020-04-06 PROCEDURE — 80053 COMPREHEN METABOLIC PANEL: CPT

## 2020-04-06 PROCEDURE — 96374 THER/PROPH/DIAG INJ IV PUSH: CPT

## 2020-04-06 PROCEDURE — 71045 X-RAY EXAM CHEST 1 VIEW: CPT

## 2020-04-06 PROCEDURE — 93005 ELECTROCARDIOGRAM TRACING: CPT

## 2020-04-06 PROCEDURE — 83605 ASSAY OF LACTIC ACID: CPT

## 2020-04-06 PROCEDURE — 82553 CREATINE MB FRACTION: CPT

## 2020-04-06 PROCEDURE — 83735 ASSAY OF MAGNESIUM: CPT

## 2020-04-06 PROCEDURE — 83880 ASSAY OF NATRIURETIC PEPTIDE: CPT

## 2020-04-06 PROCEDURE — 82550 ASSAY OF CK (CPK): CPT

## 2020-04-06 PROCEDURE — 99285 EMERGENCY DEPT VISIT HI MDM: CPT | Mod: 25

## 2020-04-06 PROCEDURE — 36415 COLL VENOUS BLD VENIPUNCTURE: CPT

## 2020-04-06 PROCEDURE — 84443 ASSAY THYROID STIM HORMONE: CPT

## 2020-04-06 PROCEDURE — 80048 BASIC METABOLIC PNL TOTAL CA: CPT

## 2020-04-06 PROCEDURE — 82728 ASSAY OF FERRITIN: CPT

## 2020-04-06 PROCEDURE — 84484 ASSAY OF TROPONIN QUANT: CPT

## 2020-04-06 PROCEDURE — 85610 PROTHROMBIN TIME: CPT

## 2020-04-06 PROCEDURE — 85379 FIBRIN DEGRADATION QUANT: CPT

## 2020-04-06 PROCEDURE — 80307 DRUG TEST PRSMV CHEM ANLYZR: CPT

## 2020-04-06 PROCEDURE — 85652 RBC SED RATE AUTOMATED: CPT

## 2020-04-06 PROCEDURE — 85730 THROMBOPLASTIN TIME PARTIAL: CPT

## 2020-04-06 PROCEDURE — 83036 HEMOGLOBIN GLYCOSYLATED A1C: CPT

## 2020-04-06 RX ORDER — MAGNESIUM SULFATE 500 MG/ML
2 VIAL (ML) INJECTION ONCE
Refills: 0 | Status: COMPLETED | OUTPATIENT
Start: 2020-04-06 | End: 2020-04-06

## 2020-04-06 RX ADMIN — Medication 81 MILLIGRAM(S): at 11:59

## 2020-04-06 RX ADMIN — Medication 50 GRAM(S): at 10:33

## 2020-04-06 NOTE — DISCHARGE NOTE PROVIDER - CARE PROVIDER_API CALL
Lesly Landa)  Cardiovascular Disease; Internal Medicine  [address] [id number]  Phone: [phone]  Fax: [phone]  Follow Up Time: 1 month

## 2020-04-06 NOTE — DISCHARGE NOTE NURSING/CASE MANAGEMENT/SOCIAL WORK - PATIENT PORTAL LINK FT
You can access the FollowMyHealth Patient Portal offered by [facility] by registering at the following website: [email or web address]. By joining [employer]’s FollowMyHealth portal, you will also be able to view your health information using other applications (apps) compatible with our system.

## 2020-04-06 NOTE — DISCHARGE NOTE PROVIDER - HOSPITAL COURSE
39 y/oM PMHx drug/tobacco/alcohol use presented TO [facility] 4/5/20 w/CP without alleviating or aggravating factors. EKG SB w/inverted T waves V3-V6 and II,III, AVF, unchanged from previous.              presented in August 2019 with chest pain and elevated troponin in the setting of NSETMI. He had an ECHO 8/2019 performed w/ EF 65%, trace MR/TR. CTA Cardiac 8/2019 w/ normal coronaries, zero calcium score, 2 shallow bridging mid/distal LAD, 4 prominent crypts to LV myocardium. He was started on Nifedipine and recommended Cardiac MRI as outpatient. He failed to follow up. Today he woke up with chest pain from sleep and presented to [facility]. He reports pain woke him from sleep. Pain is present at rest. Pain is a stabbing in the left breast. Pain does not worsen with inspiration or exertion. At [facility] 12 Lead EKG revealed SB with inverted T waves in V3-V6 and II/III/AVF similar to last admission. His troponin I was 0.536. He reports he has used cocaine in the past 1-2 days but seemed unclear on time frame. His tox screen was + for cocaine and THC. He was given full dose ASA and started on a heparin drip. He denies SOB, orthopnea, PND, Palpitations, dizziness, LE edema, syncope fever, cough, muscle aches, weakness. He was transferred to [facility] for telemetry. 39 y/oM PMHx drug/tobacco/alcohol use presented TO [facility] 4/5/20 w/CP without alleviating or aggravating factors. EKG SB w/inverted T waves V3-V6 and II,III, AVF, unchanged from previous and trop I 0.536. [facility]         In August 2019, he also presented with CP and elevated trop in setting of NSTEMI. He had ECHO w/EF 65%, trace MR/TR. CTA Cardiac also performed w/ normal coronaries, zero calcium score, 2 shallow bridging mid/distal LAD, 4 prominent crypts to LV myocardium. He was started on Nifedipine and recommended Cardiac MRI as outpatient, however, he failed to follow up.        During hospitalization, trop T (-) x 3 and patient had no recurrent CP. OF note, his tox screen was + for cocaine and THC. Patient remains hemodynamically stable without recurrent CP. Case d/w Dr. Llamas and he is to be discharged and will have Cardiac MRI as an outpatient.          On the day of discharge, the patient was seen and examined. Symptoms improved. Vital signs are stable. Labs and imaging reviewed. Patient is medically optimized and hemodynamically stable. Return precautions discussed, and importance of physician followup emphasized. Patient verbalized understanding of cardiology follow up and discontinuation of recreational drug use. He stopped taking Nifedipine that was prescribed upon discharge in August and stated would not take medications on discharge this hospitalization either.

## 2020-04-06 NOTE — DISCHARGE NOTE PROVIDER - INSTRUCTIONS
- Have at least 2 cups of vegetables a day, at least 2 whole grains a day, 2 pieces of fruit a day, limiting red meat and processed meat to no more than twice per week, increasing fish intake to at least twice a week, having nuts and seeds on most days.

## 2020-04-06 NOTE — DISCHARGE NOTE PROVIDER - NSDCCPCAREPLAN_GEN_ALL_CORE_FT
PRINCIPAL DISCHARGE DIAGNOSIS  Diagnosis: Chest pain  Assessment and Plan of Treatment: - You presented to the hospital with chest pain. You DID NOT have a heart attack. On your hospitalization in August, you had an unremarkable echocardiogram (ultrasound of the heart). Your CT scan of the heart at that time showed normal coronary artieries.  It also had incidental finding of crypts within the heart muscle, for which you need a cardiac MRI as an outpatient for further workup. Please follow up with the cardiologist within 2-4 weeks of discharge and schedule the MRI as you failed to follow up after your last hospitalization.   - As discussed, please STOP using cocaine as it can mimic symptoms of a heart attack and cause chest pain.

## 2020-04-08 DIAGNOSIS — I25.2 OLD MYOCARDIAL INFARCTION: ICD-10-CM

## 2020-04-08 DIAGNOSIS — R07.9 CHEST PAIN, UNSPECIFIED: ICD-10-CM

## 2020-04-08 DIAGNOSIS — Z91.19 PATIENT'S NONCOMPLIANCE WITH OTHER MEDICAL TREATMENT AND REGIMEN: ICD-10-CM

## 2020-04-08 DIAGNOSIS — F17.200 NICOTINE DEPENDENCE, UNSPECIFIED, UNCOMPLICATED: ICD-10-CM

## 2020-04-08 DIAGNOSIS — F19.19 OTHER PSYCHOACTIVE SUBSTANCE ABUSE WITH UNSPECIFIED PSYCHOACTIVE SUBSTANCE-INDUCED DISORDER: ICD-10-CM

## 2021-04-29 ENCOUNTER — APPOINTMENT (OUTPATIENT)
Age: 41
End: 2021-04-29

## 2022-05-10 NOTE — ED ADULT NURSE NOTE - CHIEF COMPLAINT QUOTE
patient walk in c/o chest pain since last night after snorting cocaine at club last night; still feels palpitations; HR 69 in triage
Opt out